# Patient Record
Sex: MALE | Race: WHITE | NOT HISPANIC OR LATINO | Employment: FULL TIME | URBAN - METROPOLITAN AREA
[De-identification: names, ages, dates, MRNs, and addresses within clinical notes are randomized per-mention and may not be internally consistent; named-entity substitution may affect disease eponyms.]

---

## 2017-06-02 ENCOUNTER — OFFICE VISIT (OUTPATIENT)
Dept: URGENT CARE | Facility: CLINIC | Age: 22
End: 2017-06-02
Payer: COMMERCIAL

## 2017-06-02 VITALS
SYSTOLIC BLOOD PRESSURE: 112 MMHG | TEMPERATURE: 98.8 F | BODY MASS INDEX: 21.3 KG/M2 | RESPIRATION RATE: 16 BRPM | WEIGHT: 136 LBS | OXYGEN SATURATION: 97 % | HEART RATE: 106 BPM | DIASTOLIC BLOOD PRESSURE: 80 MMHG

## 2017-06-02 DIAGNOSIS — R05.9 COUGH: ICD-10-CM

## 2017-06-02 DIAGNOSIS — H57.9 EYE PRESSURE: ICD-10-CM

## 2017-06-02 DIAGNOSIS — J01.00 ACUTE MAXILLARY SINUSITIS, RECURRENCE NOT SPECIFIED: ICD-10-CM

## 2017-06-02 PROCEDURE — 99214 OFFICE O/P EST MOD 30 MIN: CPT | Performed by: PHYSICIAN ASSISTANT

## 2017-06-02 RX ORDER — ACETAMINOPHEN 325 MG/1
650 TABLET ORAL EVERY 4 HOURS PRN
COMMUNITY
End: 2019-07-02

## 2017-06-02 RX ORDER — IBUPROFEN 200 MG
200 TABLET ORAL EVERY 6 HOURS PRN
COMMUNITY
End: 2019-07-02

## 2017-06-02 RX ORDER — FLUTICASONE PROPIONATE 50 MCG
2 SPRAY, SUSPENSION (ML) NASAL DAILY
Qty: 16 G | Refills: 0 | Status: SHIPPED | OUTPATIENT
Start: 2017-06-02 | End: 2019-07-02

## 2017-06-02 RX ORDER — AMOXICILLIN AND CLAVULANATE POTASSIUM 875; 125 MG/1; MG/1
1 TABLET, FILM COATED ORAL 2 TIMES DAILY
Qty: 20 TAB | Refills: 0 | Status: SHIPPED | OUTPATIENT
Start: 2017-06-02 | End: 2017-06-12

## 2017-06-02 ASSESSMENT — ENCOUNTER SYMPTOMS
SHORTNESS OF BREATH: 0
NAUSEA: 0
COUGH: 1
SPUTUM PRODUCTION: 1
ABDOMINAL PAIN: 0
SORE THROAT: 0
CHILLS: 1
DOUBLE VISION: 0
FEVER: 0
BLURRED VISION: 0
EYE DISCHARGE: 1
HEADACHES: 1
VOMITING: 0
EYE PAIN: 0
WHEEZING: 0
DIARRHEA: 0
EYE REDNESS: 0
PHOTOPHOBIA: 0

## 2017-06-02 NOTE — PROGRESS NOTES
Subjective:      [facility] 21 y.[facility] who presents with Sinus Problem and Cough            Sinus Problem  Associated symptoms include chills, congestion, coughing and headaches. Pertinent negatives include no ear pain, shortness of breath or sore throat.   Cough  Associated symptoms include chills and headaches. Pertinent negatives include no ear pain, eye redness, fever, rash, sore throat, shortness of breath or wheezing. His past medical history is significant for environmental allergies ( no tx).   lst 10d of sinus press to right side, c/o pressure to right eye, clear fluids draining from right eye, denies fecver, c/o chills, cough prod, denies chest congsetion, denies sorethroat - now resolved, right ear pressure, HA, denies nausea/vomiting/abdpain/diarrhea/rash. C/o mild dizziness w/ standing up. Denies PMH of asthma, PMH of brcohhitis last novemenber, denies PMH of pneumonia, denies known PMH of seasonal allerg.     Review of Systems   Constitutional: Positive for chills. Negative for fever.   HENT: Positive for congestion. Negative for ear pain and sore throat.    Eyes: Positive for discharge ( clear). Negative for blurred vision, double vision, photophobia, pain ( c/o pressure w/ sinus congestion) and redness.   Respiratory: Positive for cough and sputum production. Negative for shortness of breath and wheezing.    Gastrointestinal: Negative for nausea, vomiting, abdominal pain and diarrhea.   Skin: Negative for rash.   Neurological: Positive for headaches.   Endo/Heme/Allergies: Positive for environmental allergies ( no tx).     PMH:  has no past medical history on file.  MEDS:   Current outpatient prescriptions:   •  azithromycin (ZITHROMAX) 250 MG Tab, Take as directed, Disp: 6 Tab, Rfl: 0  •  fluticasone (FLONASE) 50 MCG/ACT nasal spray, Spray 1 Spray in nose every day., Disp: 1 Bottle, Rfl: 0  ALLERGIES: No Known Allergies  SURGHX: No past surgical history on file.  SOCHX:   reports that he has never smoked. He has never used smokeless tobacco. He reports that he does not drink alcohol or use illicit drugs.  FH: Family history was reviewed, no pertinent findings to report    I have worn a mask for the entire encounter with this patient.        Objective:     /80 mmHg  Pulse 106  Temp(Src) 37.1 °C (98.8 °F)  Resp 16  Wt 61.689 kg (136 lb)  SpO2 97%     Physical Exam   Constitutional: He is oriented to person, place, and time. He appears well-developed and well-nourished. No distress.   HENT:   Head: Normocephalic and atraumatic.   Right Ear: External ear and ear canal normal. Tympanic membrane is scarred and bulging. Tympanic membrane is not erythematous.   Left Ear: External ear and ear canal normal. Tympanic membrane is bulging. Tympanic membrane is not erythematous.   Nose: Right sinus exhibits maxillary sinus tenderness and frontal sinus tenderness. Left sinus exhibits no maxillary sinus tenderness and no frontal sinus tenderness.   Mouth/Throat: Uvula is midline and mucous membranes are normal. Posterior oropharyngeal erythema ( PND) present. No oropharyngeal exudate, posterior oropharyngeal edema or tonsillar abscesses.   Eyes: Conjunctivae, EOM and lids are normal. Pupils are equal, round, and reactive to light. Right eye exhibits no discharge, no exudate and no hordeolum. Left eye exhibits no discharge, no exudate and no hordeolum. Right conjunctiva is not injected. Left conjunctiva is not injected. No scleral icterus. Right eye exhibits normal extraocular motion. Left eye exhibits normal extraocular motion.   Neck: Neck supple.   Pulmonary/Chest: Effort normal. No respiratory distress. He has no decreased breath sounds. He has no wheezes. He has no rhonchi. He has no rales.   Musculoskeletal: Normal range of motion.   Lymphadenopathy:     He has cervical adenopathy.   Neurological: He is alert and oriented to person, place, and time. Coordination normal.   Skin: Skin  is warm and dry. He is not diaphoretic. No pallor.   Psychiatric: He has a normal mood and affect.   Nursing note and vitals reviewed.              Assessment/Plan:     1. Acute maxillary sinusitis, recurrence not specified  Supportive care is reviewed with patient/caregiver - recommend to push PO fluids and electrolytes, Nsaids/tylenol, netti pot/saline irrig, humidifier in home, flonase, ponaris, antihistamines,  take full course of Rx, take with probiotics, observe for resolution  Return to clinic with lack of resolution or progression of symptoms.  ER precautions with any worsening symptoms are reviewed with patient/caregiver and they do express understanding    Stress the importance of both trending resolution w/ tx plan as well as tx'ing seasonal allerg    - amoxicillin-clavulanate (AUGMENTIN) 875-125 MG Tab; Take 1 Tab by mouth 2 times a day for 10 days.  Dispense: 20 Tab; Refill: 0  - fluticasone (FLONASE) 50 MCG/ACT nasal spray; Spray 2 Sprays in nose every day.  Dispense: 16 g; Refill: 0    2. Cough      3. Eye pressure

## 2017-06-02 NOTE — MR AVS SNAPSHOT
Dionisio Rustw   2017 1:00 PM   Office Visit   MRN: [id number]    Department:  [facility]   Dept Phone:  [phone]    Description:  Male : 1995   Provider:  Ki Lassiter PA-C           Reason for Visit     Sinus Problem x 10 days, nasal congestion, stuffy nose, pain on Rt. side of face, headaches, Rt. eye pressure and watery.      Cough x 10 days, productive cough and some wheezing last night.  Rapid pulse today      Allergies as of 2017     No Known Allergies      You were diagnosed with     Acute maxillary sinusitis, recurrence not specified   [4672920]       Cough   [786.2.ICD-9-CM]       Eye pressure   [269721]         Vital Signs     Blood Pressure Pulse Temperature Respirations Weight Oxygen Saturation    112/80 mmHg 106 37.1 °C (98.8 °F) 16 61.689 kg (136 lb) 97%    Smoking Status                   Never Smoker            Basic Information     Date Of Birth Sex Race Ethnicity Preferred Language    1995 Male White Non- English      Health Maintenance        Date Due Completion Dates    IMM HEP B VACCINE (1 of 3 - Primary Series) 1996 ---    IMM HEP A VACCINE (1 of 2 - Standard Series) 1997 ---    IMM HPV VACCINE (1 of 3 - Male 3 Dose Series) 2007 ---    IMM VARICELLA (CHICKENPOX) VACCINE (1 of 2 - 2 Dose Adolescent Series) 2009 ---    IMM MENINGOCOCCAL VACCINE (MCV4) (1 of 1) 2012 ---    IMM DTaP/Tdap/Td Vaccine (1 - Tdap) 2015 ---            Current Immunizations     No immunizations on file.      Below and/or attached are the medications your provider expects you to take. Review all of your home medications and newly ordered medications with your provider and/or pharmacist. Follow medication instructions as directed by your provider and/or pharmacist. Please keep your medication list with you and share with your provider. Update the information when medications are discontinued, doses are changed, or new  medications (including over-the-counter products) are added; and carry medication information at all times in the event of emergency situations     Allergies:  No Known Allergies          Medications  Valid as of: June 02, 2017 -  1:34 PM    Generic Name Brand Name Tablet Size Instructions for use    Acetaminophen (Tab) TYLENOL 325 MG Take 650 mg by mouth every four hours as needed.        Amoxicillin-Pot Clavulanate (Tab) AUGMENTIN 875-125 MG Take 1 Tab by mouth 2 times a day for 10 days.        Azithromycin (Tab) ZITHROMAX 250 MG Take as directed        Fluticasone Propionate (Suspension) FLONASE 50 MCG/ACT Spray 1 Spray in nose every day.        Fluticasone Propionate (Suspension) FLONASE 50 MCG/ACT Spray 2 Sprays in nose every day.        Ibuprofen (Tab) MOTRIN 200 MG Take 200 mg by mouth every 6 hours as needed.        Pseudoephedrine-APAP-DM   Take  by mouth.        .                 Medicines prescribed today were sent to:     [facility][id number] - [place], [place] - [address]    [address] [facility] [address]    Phone: [phone] Fax: [phone]    Open 24 Hours?: No    [id number] - [place], [place] - [address]    [address] [place] [address]    Phone: [phone] Fax: [phone]    Open 24 Hours?: No      Medication refill instructions:       If your prescription bottle indicates you have medication refills left, it is not necessary to call your provider’s office. Please contact your pharmacy and they will refill your medication.    If your prescription bottle indicates you do not have any refills left, you may request refills at any time through one of the following ways: The online Off & Away system (except Urgent Care), by calling your provider’s office, or by asking your pharmacy to contact your provider’s office with a refill request. Medication refills are processed only during regular business hours and may not be available until the next business day. Your provider may  request additional information or to have a follow-up visit with you prior to refilling your medication.   *Please Note: Medication refills are assigned a new Rx number when refilled electronically. Your pharmacy may indicate that no refills were authorized even though a new prescription for the same medication is available at the pharmacy. Please request the medicine by name with the pharmacy before contacting your provider for a refill.           Statwing Access Code: [email or web address]  Expires: 7/2/2017  1:34 PM    Statwing  A secure, online tool to manage your health information     [employer]’s Statwing® is a secure, online tool that connects you to your personalized health information from the privacy of your home -- day or night - making it very easy for you to manage your healthcare. Once the activation process is completed, you can even access your medical information using the Statwing lizy, which is available for free in the Apple Lizy store or Google Play store.     Statwing provides the following levels of access (as shown below):   My Chart Features   [facility] Doctor [facility]  Specialists [facility]  Urgent  Care Non-[facility]  Primary Care  Doctor   Email your healthcare team securely and privately 24/7 X X X    Manage appointments: schedule your next appointment; view details of past/upcoming appointments X      Request prescription refills. X      View recent personal medical records, including lab and immunizations X X X X   View health record, including health history, allergies, medications X X X X   Read reports about your outpatient visits, procedures, consult and ER notes X X X X   See your discharge summary, which is a recap of your hospital and/or ER visit that includes your diagnosis, lab results, and care plan. X X       How to register for Statwing:  1. Go to  [email or web address].  2. Click on the Sign Up Now box, which takes you to the New Member Sign Up page. You will need to  provide the following information:  a. Enter your Eloquii Access Code exactly as it appears at the top of this page. (You will not need to use this code after you’ve completed the sign-up process. If you do not sign up before the expiration date, you must request a new code.)   b. Enter your date of birth.   c. Enter your home email address.   d. Click Submit, and follow the next screen’s instructions.  3. Create a Eloquii ID. This will be your Eloquii login ID and cannot be changed, so think of one that is secure and easy to remember.  4. Create a Eloquii password. You can change your password at any time.  5. Enter your Password Reset Question and Answer. This can be used at a later time if you forget your password.   6. Enter your e-mail address. This allows you to receive e-mail notifications when new information is available in Eloquii.  7. Click Sign Up. You can now view your health information.    For assistance activating your Eloquii account, call [phone]

## 2017-06-02 NOTE — Clinical Note
June 2, 2017       Patient: Dionisio Ochoa   YOB: 1995   Date of Visit: 6/2/2017         To Whom It May Concern:    It is my medical opinion that Dionisio Ochoa should be excused from work for yesterday and today due to illness.      If you have any questions or concerns, please don't hesitate to call [phone]          Sincerely,          Ki Lassiter PA-C  Electronically Signed

## 2017-09-06 ENCOUNTER — NON-PROVIDER VISIT (OUTPATIENT)
Dept: URGENT CARE | Facility: CLINIC | Age: 22
End: 2017-09-06

## 2017-09-06 DIAGNOSIS — Z02.1 PRE-EMPLOYMENT DRUG SCREENING: ICD-10-CM

## 2017-09-06 LAB
AMP AMPHETAMINE: NORMAL
COC COCAINE: NORMAL
INT CON NEG: NORMAL
INT CON POS: NORMAL
MET METHAMPHETAMINES: NORMAL
OPI OPIATES: NORMAL
PCP PHENCYCLIDINE: NORMAL
POC DRUG COMMENT 753798-OCCUPATIONAL HEALTH: NEGATIVE
THC: NORMAL

## 2017-09-06 PROCEDURE — 80305 DRUG TEST PRSMV DIR OPT OBS: CPT | Performed by: PHYSICIAN ASSISTANT

## 2017-12-27 ENCOUNTER — OCCUPATIONAL MEDICINE (OUTPATIENT)
Dept: URGENT CARE | Facility: CLINIC | Age: 22
End: 2017-12-27
Payer: COMMERCIAL

## 2017-12-27 VITALS
OXYGEN SATURATION: 98 % | WEIGHT: 140 LBS | RESPIRATION RATE: 14 BRPM | HEIGHT: 67 IN | SYSTOLIC BLOOD PRESSURE: 110 MMHG | TEMPERATURE: 97.2 F | BODY MASS INDEX: 21.97 KG/M2 | DIASTOLIC BLOOD PRESSURE: 68 MMHG | HEART RATE: 78 BPM

## 2017-12-27 DIAGNOSIS — Z02.83 ENCOUNTER FOR DRUG SCREENING: ICD-10-CM

## 2017-12-27 DIAGNOSIS — S00.03XA CONTUSION OF SCALP, INITIAL ENCOUNTER: ICD-10-CM

## 2017-12-27 DIAGNOSIS — S00.01XA ABRASION OF SCALP, INITIAL ENCOUNTER: ICD-10-CM

## 2017-12-27 DIAGNOSIS — Z02.1 PRE-EMPLOYMENT DRUG SCREENING: ICD-10-CM

## 2017-12-27 LAB
AMP AMPHETAMINE: NORMAL
BREATH ALCOHOL COMMENT: NORMAL
COC COCAINE: NORMAL
INT CON NEG: NORMAL
INT CON POS: NORMAL
MET METHAMPHETAMINES: NORMAL
OPI OPIATES: NORMAL
PCP PHENCYCLIDINE: NORMAL
POC BREATHALIZER: 0 PERCENT (ref 0–0.01)
POC DRUG COMMENT 753798-OCCUPATIONAL HEALTH: NEGATIVE
THC: NORMAL

## 2017-12-27 PROCEDURE — 99202 OFFICE O/P NEW SF 15 MIN: CPT | Performed by: EMERGENCY MEDICINE

## 2017-12-27 PROCEDURE — 80305 DRUG TEST PRSMV DIR OPT OBS: CPT | Performed by: EMERGENCY MEDICINE

## 2017-12-27 PROCEDURE — 82075 ASSAY OF BREATH ETHANOL: CPT | Performed by: EMERGENCY MEDICINE

## 2017-12-27 ASSESSMENT — ENCOUNTER SYMPTOMS
HEADACHES: 0
SENSORY CHANGE: 0
NECK PAIN: 0
LOSS OF CONSCIOUSNESS: 0

## 2017-12-27 NOTE — PATIENT INSTRUCTIONS
Head Injury, Adult  You have a head injury. Headaches and throwing up (vomiting) are common after a head injury. It should be easy to wake up from sleeping. Sometimes you must stay in the hospital. Most problems happen within the first 24 hours. Side effects may occur up to 7-10 days after the injury.   WHAT ARE THE TYPES OF HEAD INJURIES?  Head injuries can be as minor as a bump. Some head injuries can be more severe. More severe head injuries include:  · A jarring injury to the brain (concussion).  · A bruise of the brain (contusion). This mean there is bleeding in the brain that can cause swelling.  · A cracked skull (skull fracture).  · Bleeding in the brain that collects, clots, and forms a bump (hematoma).  WHEN SHOULD I GET HELP RIGHT AWAY?   · You are confused or sleepy.  · You cannot be woken up.  · You feel sick to your stomach (nauseous) or keep throwing up (vomiting).  · Your dizziness or unsteadiness is getting worse.  · You have very bad, lasting headaches that are not helped by medicine. Take medicines only as told by your doctor.  · You cannot use your arms or legs like normal.  · You cannot walk.  · You notice changes in the black spots in the center of the colored part of your eye (pupil).  · You have clear or bloody fluid coming from your nose or ears.  · You have trouble seeing.  During the next 24 hours after the injury, you must stay with someone who can watch you. This person should get help right away (call 911 in the [place] if you start to shake and are not able to control it (have seizures), you pass out, or you are unable to wake up.  HOW CAN I PREVENT A HEAD INJURY IN THE FUTURE?  · Wear seat belts.  · Wear a helmet while bike riding and playing sports like football.  · Stay away from dangerous activities around the house.  WHEN CAN I RETURN TO NORMAL ACTIVITIES AND ATHLETICS?  See your doctor before doing these activities. You should not do normal activities or play contact sports until 1  week after the following symptoms have stopped:  · Headache that does not go away.  · Dizziness.  · Poor attention.  · Confusion.  · Memory problems.  · Sickness to your stomach or throwing up.  · Tiredness.  · Fussiness.  · Bothered by bright lights or loud noises.  · Anxiousness or depression.  · Restless sleep.  MAKE SURE YOU:   · Understand these instructions.  · Will watch your condition.  · Will get help right away if you are not doing well or get worse.     This information is not intended to replace advice given to you by your health care provider. Make sure you discuss any questions you have with your health care provider.     Document Released: 11/30/2009 Document Revised: 01/08/2016 Document Reviewed: 08/25/2014  [employer] Interactive Patient Education ©2016 [employer]

## 2017-12-27 NOTE — PROGRESS NOTES
"Subjective:      Dionisio Ochoa is a 22 y.o. male who presents with Head Injury (NEW WCright side of head hit a mirro )      Date of injury: 12/27/2017. 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 Previous injury: Concussion. Second job: none. Outside activity: none. Contributing medical illness: none. Severity: mild, improving. Prior treatment: cleansed. Location: right frontal scalp. Radiation: none. Numbness/tingling: none. Focal weakness: none. No loss of consciousness, dizziness, nausea or vomiting, neck pain. Tetanus up-to-date     Head Injury    Pertinent negatives include no headaches.       Review of Systems   Constitutional: Negative for malaise/fatigue.   Musculoskeletal: Negative for neck pain.   Neurological: Negative for sensory change, loss of consciousness and headaches.     PMH:  has no past medical history on file.  MEDS:   Current Outpatient Prescriptions:   •  Pseudoephedrine-APAP-DM (DAYQUIL PO), Take  by mouth., Disp: , Rfl:   •  ibuprofen (MOTRIN) 200 MG Tab, Take 200 mg by mouth every 6 hours as needed., Disp: , Rfl:   •  acetaminophen (TYLENOL) 325 MG Tab, Take 650 mg by mouth every four hours as needed., Disp: , Rfl:   •  fluticasone (FLONASE) 50 MCG/ACT nasal spray, Spray 2 Sprays in nose every day., Disp: 16 g, Rfl: 0  •  azithromycin (ZITHROMAX) 250 MG Tab, Take as directed, Disp: 6 Tab, Rfl: 0  •  fluticasone (FLONASE) 50 MCG/ACT nasal spray, Spray 1 Spray in nose every day., Disp: 1 Bottle, Rfl: 0  ALLERGIES: No Known Allergies  SURGHX: History reviewed. No pertinent surgical history.  SOCHX:  reports that he has been smoking.  He has never used smokeless tobacco. He reports that he drinks alcohol. He reports that he does not use drugs.  FH: family history is not on file.     Objective:     /68   Pulse 78   Temp 36.2 °C (97.2 °F)   Resp 14   Ht 1.702 m (5' 7\")   Wt 63.5 kg " (140 lb)   SpO2 98%   BMI 21.93 kg/m²      Physical Exam   Cardiovascular: Normal rate, regular rhythm and normal heart sounds.    Pulmonary/Chest: Effort normal and breath sounds normal.       Gen.: Alert, cooperative, no acute distress. Head: Minimal swelling, minor abrasion right frontal region. No bony defect or foreign body. Eyes: Pupils equal round and reactive to light, extraocular movement intact. Conjunctiva clear. Nose clear without epistaxis. Ears with normal tympanic membranes. Neck supple, nontender, normal range of motion. Neurological: Alert and oriented ×3, cranial nerves intact, no motor or sensory function deficit, no tremor or cerebellar signs.     D39 and C4 forms completed  Assessment/Plan:     1. Contusion of scalp, initial encounter  Head injury precautions provided    2. Abrasion of scalp, initial encounter    3. Encounter for drug screening  - POCT Breath Alcohol Test  - POCT 6 Panel Urine Drug Screen    4. Pre-employment drug screening

## 2017-12-27 NOTE — LETTER
[facility]  [address] [place] [address]  Phone:  [phone] - Fax:  [phone]   [facility] Progress Report and Disability Certification  Date of Service: 12/27/2017   No Show:  No  Date / Time of Next Visit:  MMI   Claim Information   Patient Name: Dionisio Ochoa  Claim Number:     Employer: [employer]  Date of Injury: 12/27/2017     Insurer / TPA: [employer]  ID / SSN:     Occupation: [id number]  Diagnosis: Diagnoses of Contusion of scalp, initial encounter, Abrasion of scalp, initial encounter, Encounter for drug screening, and Pre-employment drug screening were pertinent to this visit.    Medical Information   Related to Industrial Injury? Yes    Subjective Complaints:  Date of injury: 12/27/2017. 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 Previous injury: Concussion. Second job: none. Outside activity: none. Contributing medical illness: none. Severity: mild, improving. Prior treatment: cleansed. Location: right frontal scalp. Radiation: none. Numbness/tingling: none. Focal weakness: none. No loss of consciousness, dizziness, nausea or vomiting, neck pain. Tetanus up-to-date   Objective Findings: Gen.: Alert, cooperative, no acute distress. Head: Minimal swelling, minor abrasion right frontal region. No bony defect or foreign body. Eyes: Pupils equal round and reactive to light, extraocular movement intact. Conjunctiva clear. Nose clear without epistaxis. Ears with normal tympanic membranes. Neck supple, nontender, normal range of motion. Neurological: Alert and oriented ×3, cranial nerves intact, no motor or sensory function deficit, no tremor or cerebellar signs.   Pre-Existing Condition(s):     Assessment:   Initial Visit    Status: Discharged / Care Transfer  Permanent Disability:No    Plan:      Diagnostics:      Comments:        Disability Information   Status: Released to Full Duty    From:     Through:   Restrictions are:     Physical Restrictions   Sitting:    Standing:    Stooping:    Bending:      Squatting:    Walking:    Climbing:    Pushing:      Pulling:    Other:    Reaching Above Shoulder (L):   Reaching Above Shoulder (R):       Reaching Below Shoulder (L):    Reaching Below Shoulder (R):      Not to exceed Weight Limits   Carrying(hrs):   Weight Limit(lb):   Lifting(hrs):   Weight  Limit(lb):     Comments:      Repetitive Actions   Hands: i.e. Fine Manipulations from Grasping:     Feet: i.e. Operating Foot Controls:     Driving / Operate Machinery:     Physician Name: Bill Stark M.D. Physician Signature: BILL Villaseñor M.D. e-Signature: Dr. Wiley Cunningham, Medical Director   Clinic Name / Location: [address] [address] Clinic Phone Number: Dept: [phone]   Appointment Time: 11:30 Am Visit Start Time: 2:29 PM   Check-In Time:  2:22 Pm Visit Discharge Time:  3:06pm   Original-Treating Physician or Chiropractor    Page 2-Insurer/TPA    Page 3-Employer    Page 4-Employee

## 2017-12-27 NOTE — LETTER
"EMPLOYEE’S CLAIM FOR COMPENSATION/ REPORT OF INITIAL TREATMENT  FORM C-4    EMPLOYEE’S CLAIM - PROVIDE ALL INFORMATION REQUESTED   First Name  Dionisio Last Name  Don Birthdate                    1995                Sex  male Claim Number   Home Address  [address] Age  22 y.o. Height  1.702 m (5' 7\") Weight  63.5 kg (140 lb) [facility]     [place]  [address] Telephone  [phone] (home)    Mailing Address  [address] [place]  [address] Primary Language Spoken  English    Insurer   Third Party   [place] [employer]   Employee's Occupation (Job Title) When Injury or Occupational Disease Occurred  [id number]    Employer's Name  [employer]  Telephone  [phone]    Employer Address  [address] [address]  [facility] [place]  [address]    Date of Injury  12/27/2017               Hour of Injury  10:30 AM Date Employer Notified  12/27/2017 Last Day of Work after Injury or Occupational Disease  12/27/2017 Supervisor to Whom Injury Reported  Maurisio Grimm   Address or Location of Accident (if applicable)  [[address] [place] [address]]   What were you doing at the time of accident? (if applicable)  Wiping a tire    How did this injury or occupational disease occur? (Be specific an answer in detail. Use additional sheet if necessary)  Stood up under a lifted trucks mirror   If you believe that you have an occupational disease, when did you first have knowledge of the disability and it relationship to your employment?  N/A Witnesses to the Accident  Bill Santiago      Nature of Injury or Occupational Disease  Laceration  Part(s) of Body Injured or Affected  Skull, N/A, N/A    I certify that the above is true and correct to the best of my knowledge and that I have provided this information in order to obtain the benefits of [place] and " [place]  I hereby authorize any physician, chiropractor, surgeon, practitioner, or other person, any hospital, including [facility] or [facility], any medical service organization, any insurance company, or other institution or organization to release to each other, any medical or other information, including benefits paid or payable, pertinent to this injury or disease, except information relative to diagnosis, treatment and/or counseling for AIDS, psychological conditions, alcohol or controlled substances, for which I must give specific authorization.  A Photostat of this authorization shall be as valid as the original.     Date   Place   Employee’s Signature   THIS REPORT MUST BE COMPLETED AND MAILED WITHIN 3 WORKING DAYS OF TREATMENT   Place  [facility]  Name of Facility  [facility]   Date  12/27/2017 Diagnosis  (S00.03XA) Contusion of scalp, initial encounter  (S00.01XA) Abrasion of scalp, initial encounter  (Z02.83) Encounter for drug screening  (Z02.1) Pre-employment drug screening Is there evidence the injured employee was under the influence of alcohol and/or another controlled substance at the time of accident?   Hour  2:29 PM Description of Injury or Disease  Diagnoses of Contusion of scalp, initial encounter, Abrasion of scalp, initial encounter, Encounter for drug screening, and Pre-employment drug screening were pertinent to this visit. No   Treatment  Cleansed abrasion  Have you advised the patient to remain off work five days or more? No   X-Ray Findings      If Yes   From Date  To Date      From information given by the employee, together with medical evidence, can you directly connect this injury or occupational disease as job incurred?  Yes If No Full Duty  Yes Modified Duty      Is additional medical care by a physician indicated?  No If Modified Duty, Specify any Limitations / Restrictions     "  Do you know of any previous injury or disease contributing to this condition or occupational disease?                            No   Date  12/27/2017 Print Doctor’s Name Bill Stark M.D. I certify the employer’s copy of  this form was mailed on:   Address  [address]     [place] Zip  [address]    Provider’s Tax ID Number  [id number] Telephone  Dept: [phone]        [email or web address]-[email or web address] Howard M.D.   e-Signature: Dr. Wiley Cunningham, Medical Director Degree  MD        ORIGINAL-TREATING PHYSICIAN OR CHIROPRACTOR    PAGE 2-INSURER/TPA    PAGE 3-EMPLOYER    PAGE 4-EMPLOYEE             Form C-4 (rev10/07)              BRIEF DESCRIPTION OF RIGHTS AND BENEFITS  (Pursuant to [place]RS 616C.050)    Notice of Injury or Occupational Disease (Incident Report Form C-1): If an injury or occupational disease (OD) arises out of and in the  course of employment, you must provide written notice to your employer as soon as practicable, but no later than 7 days after the accident or  OD. Your employer shall maintain a sufficient supply of the required forms.    Claim for Compensation (Form C-4): If medical treatment is sought, the form C-4 is available at the place of initial treatment. A completed  \"Claim for Compensation\" (Form C-4) must be filed within 90 days after an accident or OD. The treating physician or chiropractor must,  within 3 working days after treatment, complete and mail to the employer, the employer's insurer and third-party , the Claim for  Compensation.    Medical Treatment: If you require medical treatment for your on-the-job injury or OD, you may be required to select a physician or  chiropractor from a list provided by your workers’ compensation insurer, if it has contracted with an Organization for Managed Care (MCO) or  Preferred Provider Organization (PPO) or providers of health care. If your employer has not entered into a contract with an MCO or PPO, " you  may select a physician or chiropractor from the Panel of Physicians and Chiropractors. Any medical costs related to your industrial injury or  OD will be paid by your insurer.    Temporary Total Disability (TTD): If your doctor has certified that you are unable to work for a period of at least 5 consecutive days, or 5  cumulative days in a 20-day period, or places restrictions on you that your employer does not accommodate, you may be entitled to TTD  compensation.    Temporary Partial Disability (TPD): If the wage you receive upon reemployment is less than the compensation for TTD to which you are  entitled, the insurer may be required to pay you TPD compensation to make up the difference. TPD can only be paid for a maximum of 24  months.    Permanent Partial Disability (PPD): When your medical condition is stable and there is an indication of a PPD as a result of your injury or  OD, within 30 days, your insurer must arrange for an evaluation by a rating physician or chiropractor to determine the degree of your PPD. The  amount of your PPD award depends on the date of injury, the results of the PPD evaluation and your age and wage.    Permanent Total Disability (PTD): If you are medically certified by a treating physician or chiropractor as permanently and totally disabled  and have been granted a PTD status by your insurer, you are entitled to receive monthly benefits not to exceed 66 2/3% of your average  monthly wage. The amount of your PTD payments is subject to reduction if you previously received a PPD award.    Vocational Rehabilitation Services: You may be eligible for vocational rehabilitation services if you are unable to return to the job due to a  permanent physical impairment or permanent restrictions as a result of your injury or occupational disease.    Transportation and Per Brown Reimbursement: You may be eligible for travel expenses and per brown associated with medical treatment.    Reopening:  You may be able to reopen your claim if your condition worsens after claim closure.    Appeal Process: If you disagree with a written determination issued by the insurer or the insurer does not respond to your request, you may  appeal to the Department of Administration, , by following the instructions contained in your determination letter. You must  appeal the determination within 70 days from the date of the determination letter at [address], [address], [place], [place]  [address], or [address], [address], [place], [place] [address]. If you disagree with the  decision, you may appeal to the  Department of Administration, . You must file your appeal within 30 days from the date of the  decision  letter at [address], [address], [place], [place] [address], or [address], [place], [place] [address]. If you  disagree with a decision of an , you may file a petition for judicial review with the District Court. You must do so within 30  days of the Appeal Officer’s decision. You may be represented by an  at your own expense or you may contact the [facility] for possible  representation.    [place]  for Injured Workers (NAIW): If you disagree with a  decision, you may request that NAIW represent you  without charge at an  Hearing. For information regarding denial of benefits, you may contact the [facility] at: [address], [address], [place] [address], [phone], or [address], [place] [address], [phone]    To File a Complaint with the Division: If you wish to file a complaint with the  of the Division of Industrial Relations (DIR),  please contact the Workers’ Compensation Section, [address], [address], [place], [place] [address], telephone [phone], or  [address],  [address], [place] [address], telephone [phone].    For assistance with Workers’ Compensation Issues: you may contact the Office of the Governor Consumer Health Assistance, [address] [place] [address], [address], [place], [place] [address], Toll Free 1-150.875.6169, Web site: [email or web address]., E-mail  [email or web address]                                                                                                                                                                                                                                   __________________________________________________________________                                                                   _________________                Employee Name / Signature                                                                                                                                                       Date                                                                                                                                                                                                     D-2 (rev. 10/07)

## 2019-02-19 ENCOUNTER — OFFICE VISIT (OUTPATIENT)
Dept: URGENT CARE | Facility: CLINIC | Age: 24
End: 2019-02-19
Payer: COMMERCIAL

## 2019-02-19 VITALS
SYSTOLIC BLOOD PRESSURE: 102 MMHG | RESPIRATION RATE: 16 BRPM | TEMPERATURE: 99.4 F | BODY MASS INDEX: 22.44 KG/M2 | WEIGHT: 143 LBS | HEART RATE: 78 BPM | HEIGHT: 67 IN | OXYGEN SATURATION: 98 % | DIASTOLIC BLOOD PRESSURE: 62 MMHG

## 2019-02-19 DIAGNOSIS — J01.00 ACUTE MAXILLARY SINUSITIS, RECURRENCE NOT SPECIFIED: ICD-10-CM

## 2019-02-19 DIAGNOSIS — R05.9 COUGH: ICD-10-CM

## 2019-02-19 PROCEDURE — 99214 OFFICE O/P EST MOD 30 MIN: CPT | Performed by: FAMILY MEDICINE

## 2019-02-19 RX ORDER — AMOXICILLIN AND CLAVULANATE POTASSIUM 875; 125 MG/1; MG/1
1 TABLET, FILM COATED ORAL 2 TIMES DAILY
Qty: 20 TAB | Refills: 0 | Status: SHIPPED | OUTPATIENT
Start: 2019-02-19 | End: 2019-03-01

## 2019-02-19 RX ORDER — PROMETHAZINE HYDROCHLORIDE AND CODEINE PHOSPHATE 6.25; 1 MG/5ML; MG/5ML
5 SYRUP ORAL 4 TIMES DAILY PRN
Qty: 120 ML | Refills: 0 | Status: SHIPPED | OUTPATIENT
Start: 2019-02-19 | End: 2019-02-26

## 2019-02-19 NOTE — LETTER
February 19, 2019         Patient: Dionisio Ochoa   YOB: 1995   Date of Visit: 2/19/2019           To Whom it May Concern:    Dionisio Ochoa was seen in my clinic on 2/19/2019. Please excuse 2/19 and 2/20/2019.      Sincerely,           Manish Hughes M.D.  Electronically Signed

## 2019-02-20 NOTE — PROGRESS NOTES
"Subjective:      [facility] 23 [facility] who presents with Sinus Problem (x4days, sinus congestiona and pain, pressure around eyes, eyes water, congestion in lung area, slight cough)              4 days maxillary sinus pressure and drainage.  No fever.  Past medical history sinusitis, no sinus surgery.  Cough likely due to drainage. Initial SOB resolved.  No wheeze.  Symptoms are moderate severity and progressively worse. OTC emergenC without relief.  No other aggravating or alleviating factors.        Review of Systems   Constitutional: Negative for malaise/fatigue and weight loss.   HENT: Negative for ear discharge and ear pain.    Eyes: Negative for discharge and redness.   Respiratory: Negative for wheezing.    Cardiovascular: Negative for leg swelling.   Gastrointestinal: Negative for diarrhea and vomiting.   Musculoskeletal: Negative for joint pain and myalgias.   Skin: Negative for itching and rash.   Neurological: Negative for headaches.     .  Medications, Allergies, and current problem list reviewed today in Epic       Objective:     /62 (BP Location: Left arm, Patient Position: Sitting, BP Cuff Size: Adult)   Pulse 78   Temp 37.4 °C (99.4 °F) (Temporal)   Resp 16   Ht 1.702 m (5' 7\")   Wt 64.9 kg (143 lb)   SpO2 98%   BMI 22.40 kg/m²      Physical Exam   Constitutional: He is oriented to person, place, and time. He appears well-developed and well-nourished. No distress.   HENT:   Head: Normocephalic and atraumatic.   Right Ear: External ear normal.   Left Ear: External ear normal.   Tender maxillary sinus bilateral.  PND.   Eyes: Conjunctivae are normal.   Neck: Neck supple.   Cardiovascular: Normal rate, regular rhythm and normal heart sounds.    No murmur heard.  Pulmonary/Chest: Effort normal and breath sounds normal.   Lymphadenopathy:     He has no cervical adenopathy.   Neurological: He is alert and oriented to person, place, and time.   Skin: Skin is warm and " dry. No rash noted.               Assessment/Plan:     1. Acute maxillary sinusitis, recurrence not specified  amoxicillin-clavulanate (AUGMENTIN) 875-125 MG Tab   2. Cough  promethazine-codeine (PHENERGAN-CODEINE) 6.25-10 MG/5ML Syrup     Differential diagnosis, natural history, supportive care, and indications for immediate follow-up discussed at length.     Contingent antibiotic prescription given to patient to fill upon meeting criteria of guidelines discussed.     Nasal saline, decongestant, nasal corticosteroid

## 2019-02-28 ASSESSMENT — ENCOUNTER SYMPTOMS
EYE DISCHARGE: 0
HEADACHES: 0
EYE REDNESS: 0
WEIGHT LOSS: 0
DIARRHEA: 0
WHEEZING: 0
MYALGIAS: 0
VOMITING: 0

## 2019-07-02 ENCOUNTER — OFFICE VISIT (OUTPATIENT)
Dept: URGENT CARE | Facility: CLINIC | Age: 24
End: 2019-07-02
Payer: COMMERCIAL

## 2019-07-02 ENCOUNTER — APPOINTMENT (OUTPATIENT)
Dept: RADIOLOGY | Facility: IMAGING CENTER | Age: 24
End: 2019-07-02
Attending: FAMILY MEDICINE
Payer: COMMERCIAL

## 2019-07-02 VITALS
BODY MASS INDEX: 22.76 KG/M2 | TEMPERATURE: 98.4 F | RESPIRATION RATE: 16 BRPM | HEIGHT: 67 IN | HEART RATE: 86 BPM | DIASTOLIC BLOOD PRESSURE: 64 MMHG | OXYGEN SATURATION: 97 % | SYSTOLIC BLOOD PRESSURE: 110 MMHG | WEIGHT: 145 LBS

## 2019-07-02 DIAGNOSIS — S83.91XA SPRAIN OF RIGHT KNEE, UNSPECIFIED LIGAMENT, INITIAL ENCOUNTER: ICD-10-CM

## 2019-07-02 PROCEDURE — 73564 X-RAY EXAM KNEE 4 OR MORE: CPT | Mod: TC,RT | Performed by: FAMILY MEDICINE

## 2019-07-02 PROCEDURE — 99214 OFFICE O/P EST MOD 30 MIN: CPT | Performed by: FAMILY MEDICINE

## 2019-07-02 RX ORDER — TRAMADOL HYDROCHLORIDE 50 MG/1
50 TABLET ORAL EVERY 6 HOURS PRN
Qty: 20 TAB | Refills: 0 | Status: SHIPPED | OUTPATIENT
Start: 2019-07-02 | End: 2019-07-09

## 2019-07-02 RX ORDER — MELOXICAM 15 MG/1
15 TABLET ORAL DAILY
Qty: 7 TAB | Refills: 1 | Status: SHIPPED | OUTPATIENT
Start: 2019-07-02 | End: 2019-07-09

## 2019-07-02 NOTE — PROGRESS NOTES
"Subjective:      Dionisio Barlow is a 23 y.o. male who presents with Knee Pain (hyper extended knee, x 1 week, )      - This is a pleasant and non toxic appearing 23 y.o. male with c/o pain Rt knee x 2 wks. Started after attempting a front flip and landed wrong. Worse walking, better rest           ALLERGIES:  Patient has no known allergies.     PMH:  History reviewed. No pertinent past medical history.     PSH:  History reviewed. No pertinent surgical history.    MEDS:    Current Outpatient Prescriptions:   •  meloxicam (MOBIC) 15 MG tablet, Take 1 Tab by mouth every day for 7 days., Disp: 7 Tab, Rfl: 1  •  tramadol (ULTRAM) 50 MG Tab, Take 1 Tab by mouth every 6 hours as needed for up to 7 days., Disp: 20 Tab, Rfl: 0    ** I have documented what I find to be significant in regards to past medical, social, family and surgical history  in my HPI or under PMH/PSH/FH review section, otherwise it is contributory **           HPI    Review of Systems   Musculoskeletal: Positive for joint pain.   All other systems reviewed and are negative.         Objective:     /64   Pulse 86   Temp 36.9 °C (98.4 °F)   Resp 16   Ht 1.702 m (5' 7\")   Wt 65.8 kg (145 lb)   SpO2 97%   BMI 22.71 kg/m²      Physical Exam   Constitutional: He appears well-developed. No distress.   HENT:   Head: Normocephalic and atraumatic.   Neck: Neck supple.   Cardiovascular: Regular rhythm.    No murmur heard.  Pulmonary/Chest: Effort normal. No respiratory distress.   Neurological: He is alert. He exhibits normal muscle tone.   Skin: Skin is warm and dry.   Psychiatric: He has a normal mood and affect. Judgment normal.   Nursing note and vitals reviewed.    Rt knee: no wounds, discoloration, gross deformity or effusions. Full srom. Mild TTP over entire knee reported. Ligaments menisci seem intact           Assessment/Plan:         1. Sprain of right knee, unspecified ligament, initial encounter  DX-KNEE COMPLETE 4+ RIGHT    " REFERRAL TO SPORTS MEDICINE    meloxicam (MOBIC) 15 MG tablet    tramadol (ULTRAM) 50 MG Tab       Xray: no acute findings by MY READ. RADIOLOGY READ PENDING.       Dx & d/c instructions discussed w/ patient and/or family members.     Follow up with PCP (or here if PCP unavailable) in 2-3 days if symptoms not improving, ER if feeling/getting worse.    Any realistic and/or common medication side effects that may have been given today(i.e. Rash, GI upset/constipation, sedation, elevation of BP or blood sugars) reviewed.     Patient left in stable condition

## 2019-07-02 NOTE — LETTER
July 2, 2019         Patient: Dionisio Barlow   YOB: 1995   Date of Visit: 7/2/2019           To Whom it May Concern:    Dionisio Barlow was seen in my clinic on 7/2/2019. He may return to work in 1-3 days.    If you have any questions or concerns, please don't hesitate to call.        Sincerely,           Demond Patton M.D.  Electronically Signed

## 2020-11-11 ENCOUNTER — OFFICE VISIT (OUTPATIENT)
Dept: URGENT CARE | Facility: CLINIC | Age: 25
End: 2020-11-11
Payer: COMMERCIAL

## 2020-11-11 ENCOUNTER — HOSPITAL ENCOUNTER (OUTPATIENT)
Facility: MEDICAL CENTER | Age: 25
End: 2020-11-11
Attending: PHYSICIAN ASSISTANT
Payer: COMMERCIAL

## 2020-11-11 VITALS
HEIGHT: 68 IN | HEART RATE: 86 BPM | SYSTOLIC BLOOD PRESSURE: 118 MMHG | RESPIRATION RATE: 20 BRPM | BODY MASS INDEX: 23.05 KG/M2 | TEMPERATURE: 97.7 F | WEIGHT: 152.08 LBS | DIASTOLIC BLOOD PRESSURE: 74 MMHG | OXYGEN SATURATION: 99 %

## 2020-11-11 DIAGNOSIS — J02.0 PHARYNGITIS DUE TO STREPTOCOCCUS SPECIES: ICD-10-CM

## 2020-11-11 DIAGNOSIS — R51.9 NONINTRACTABLE HEADACHE, UNSPECIFIED CHRONICITY PATTERN, UNSPECIFIED HEADACHE TYPE: ICD-10-CM

## 2020-11-11 LAB
INT CON NEG: NORMAL
INT CON POS: NORMAL
S PYO AG THROAT QL: POSITIVE

## 2020-11-11 PROCEDURE — U0003 INFECTIOUS AGENT DETECTION BY NUCLEIC ACID (DNA OR RNA); SEVERE ACUTE RESPIRATORY SYNDROME CORONAVIRUS 2 (SARS-COV-2) (CORONAVIRUS DISEASE [COVID-19]), AMPLIFIED PROBE TECHNIQUE, MAKING USE OF HIGH THROUGHPUT TECHNOLOGIES AS DESCRIBED BY CMS-2020-01-R: HCPCS

## 2020-11-11 PROCEDURE — 99214 OFFICE O/P EST MOD 30 MIN: CPT | Performed by: PHYSICIAN ASSISTANT

## 2020-11-11 PROCEDURE — 87880 STREP A ASSAY W/OPTIC: CPT | Performed by: PHYSICIAN ASSISTANT

## 2020-11-11 RX ORDER — LIDOCAINE HYDROCHLORIDE 20 MG/ML
5 SOLUTION OROPHARYNGEAL
Qty: 100 ML | Refills: 0 | Status: SHIPPED | OUTPATIENT
Start: 2020-11-11 | End: 2020-11-16

## 2020-11-11 RX ORDER — AMOXICILLIN 500 MG/1
500 CAPSULE ORAL 2 TIMES DAILY
Qty: 20 CAP | Refills: 0 | Status: SHIPPED | OUTPATIENT
Start: 2020-11-11 | End: 2020-11-21

## 2020-11-11 RX ORDER — IBUPROFEN 200 MG
600-800 TABLET ORAL EVERY 6 HOURS PRN
COMMUNITY

## 2020-11-11 ASSESSMENT — ENCOUNTER SYMPTOMS
VOMITING: 0
PALPITATIONS: 0
MYALGIAS: 0
CHILLS: 0
FEVER: 0
SORE THROAT: 1
EYE PAIN: 0
DIARRHEA: 0
TINGLING: 0
WHEEZING: 0
NAUSEA: 0
SENSORY CHANGE: 0
COUGH: 0
EYE DISCHARGE: 0
DIAPHORESIS: 0
STRIDOR: 0
DIZZINESS: 0
SINUS PAIN: 0
SPUTUM PRODUCTION: 0
HEADACHES: 1
ABDOMINAL PAIN: 0
SHORTNESS OF BREATH: 0

## 2020-11-11 NOTE — PROGRESS NOTES
Subjective:   Dionisio Barlow is a 25 y.o. male who presents for Pharyngitis, Coronavirus Screening, and Headache      HPI:  This is a very pleasant 25-year-old male presented to the clinic with sore throat and headache x4 days.  Patient states his symptoms started on Friday.  He felt fatigued and tired.  He then had a sore throat described as moderately painful.  Denies any difficulty swallowing.  Denies any hoarseness.  He has had occasional headaches over the same time.  He has used ibuprofen this provided moderate relief to his symptoms.  He denies any cough, shortness of breath or chest pain.  His boss tested positive for COVID-19 last week he has been at home since.  He denies any past medical history significant for asthma.  He no longer smokes cigarettes.    Review of Systems   Constitutional: Positive for malaise/fatigue. Negative for chills, diaphoresis and fever.   HENT: Positive for congestion and sore throat. Negative for ear pain and sinus pain.    Eyes: Negative for pain and discharge.   Respiratory: Negative for cough, sputum production, shortness of breath, wheezing and stridor.    Cardiovascular: Negative for chest pain and palpitations.   Gastrointestinal: Negative for abdominal pain, diarrhea, nausea and vomiting.   Musculoskeletal: Negative for myalgias.   Neurological: Positive for headaches. Negative for dizziness, tingling and sensory change.   Endo/Heme/Allergies: Negative for environmental allergies.       Medications:    • ibuprofen Tabs    Allergies: Patient has no known allergies.    Problem List: Dionisio Barlow does not have a problem list on file.    Surgical History:  No past surgical history on file.    Past Social Hx: Dionisio Barlow  reports that he has quit smoking. He has never used smokeless tobacco. He reports current alcohol use. He reports current drug use. Drug: Marijuana.     Past Family Hx:  Dionisio Barlow  "family history is not on file.     Problem list, medications, and allergies reviewed by myself today in Epic.     Objective:     /74   Pulse 86   Temp 36.5 °C (97.7 °F) (Temporal)   Resp 20   Ht 1.727 m (5' 8\")   Wt 69 kg (152 lb 1.3 oz)   SpO2 99%   BMI 23.12 kg/m²     Physical Exam  Constitutional:       General: He is not in acute distress.     Appearance: Normal appearance. He is not ill-appearing, toxic-appearing or diaphoretic.   HENT:      Head: Normocephalic and atraumatic.      Right Ear: Tympanic membrane, ear canal and external ear normal.      Left Ear: Tympanic membrane, ear canal and external ear normal.      Nose: Nose normal. No congestion or rhinorrhea.      Mouth/Throat:      Mouth: Mucous membranes are moist.      Pharynx: Posterior oropharyngeal erythema present. No oropharyngeal exudate.      Comments: Posterior oropharynx erythematous.  No tonsillar edema or exudate.  Uvula midline nondeviated.  Eyes:      Conjunctiva/sclera: Conjunctivae normal.   Neck:      Musculoskeletal: Normal range of motion. No neck rigidity or muscular tenderness.   Cardiovascular:      Rate and Rhythm: Normal rate and regular rhythm.      Pulses: Normal pulses.      Heart sounds: Normal heart sounds.   Pulmonary:      Effort: Pulmonary effort is normal.      Breath sounds: Normal breath sounds. No wheezing.   Abdominal:      Palpations: Abdomen is soft.      Tenderness: There is no abdominal tenderness.   Lymphadenopathy:      Cervical: No cervical adenopathy.   Skin:     General: Skin is warm and dry.      Capillary Refill: Capillary refill takes less than 2 seconds.   Neurological:      General: No focal deficit present.      Mental Status: He is alert. Mental status is at baseline.   Psychiatric:         Mood and Affect: Mood normal.         Thought Content: Thought content normal.       Rapid strep: Positive  COVID-19:    Assessment/Plan:     Diagnosis and associated orders:     1. Pharyngitis, " unspecified etiology  POCT Rapid Strep A    COVID/SARS COV-2 PCR   2. Nonintractable headache, unspecified chronicity pattern, unspecified headache type  POCT Rapid Strep A    COVID/SARS COV-2 PCR      Comments/MDM:     Recommended strict isolation precautions.  Stay isolated until I reach out with final results.  Results may take 2 to 3 days.  Recommended supportive treatment including increase fluids and rest.  Use Tylenol and ibuprofen as needed for pain and fever.  Take antibiotics as directed for strep pharyngitis.  May use viscous lidocaine as needed for sore throat.  Red flag symptoms were discussed with the patient at length today.  If any of these symptoms present return to the clinic or nearest emergency department.  Please call with any questions or concerns.         Differential diagnosis, natural history, supportive care, and indications for immediate follow-up discussed.    Advised the patient to follow-up with the primary care physician for recheck, reevaluation, and consideration of further management.    Please note that this dictation was created using voice recognition software. I have made reasonable attempt to correct obvious errors, but I expect that there are errors of grammar and possibly content that I did not discover before finalizing the note.    This note was electronically signed by DAMIEN Mendiola PA-C

## 2020-11-12 LAB
COVID ORDER STATUS COVID19: NORMAL
SARS-COV-2 RNA RESP QL NAA+PROBE: NOTDETECTED
SPECIMEN SOURCE: NORMAL

## 2021-12-29 ENCOUNTER — OFFICE VISIT (OUTPATIENT)
Dept: URGENT CARE | Facility: CLINIC | Age: 26
End: 2021-12-29
Payer: COMMERCIAL

## 2021-12-29 VITALS
BODY MASS INDEX: 22.73 KG/M2 | HEART RATE: 80 BPM | DIASTOLIC BLOOD PRESSURE: 62 MMHG | TEMPERATURE: 97.6 F | RESPIRATION RATE: 18 BRPM | SYSTOLIC BLOOD PRESSURE: 108 MMHG | OXYGEN SATURATION: 96 % | HEIGHT: 68 IN | WEIGHT: 150 LBS

## 2021-12-29 DIAGNOSIS — S39.012A STRAIN OF LUMBAR PARASPINAL MUSCLE, INITIAL ENCOUNTER: Primary | ICD-10-CM

## 2021-12-29 DIAGNOSIS — M25.551 ACUTE RIGHT HIP PAIN: ICD-10-CM
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
## 2021-12-29 PROCEDURE — 99213 OFFICE O/P EST LOW 20 MIN: CPT | Performed by: NURSE PRACTITIONER

## 2021-12-29 RX ORDER — CYCLOBENZAPRINE HCL 5 MG
5-10 TABLET ORAL
Qty: 15 TABLET | Refills: 0 | Status: SHIPPED | OUTPATIENT
Start: 2021-12-29 | End: 2022-01-03

## 2021-12-29 RX ORDER — IBUPROFEN 800 MG/1
800 TABLET ORAL EVERY 8 HOURS PRN
Qty: 30 TABLET | Refills: 0 | Status: SHIPPED | OUTPATIENT
Start: 2021-12-29 | End: 2022-01-03

## 2021-12-29 ASSESSMENT — ENCOUNTER SYMPTOMS
NECK PAIN: 0
SENSORY CHANGE: 0
TINGLING: 0
WEAKNESS: 0
FEVER: 0
MYALGIAS: 1
BACK PAIN: 1
CHILLS: 0
FALLS: 1

## 2021-12-29 ASSESSMENT — PAIN SCALES - GENERAL: PAINLEVEL: 6=MODERATE PAIN

## 2021-12-30 NOTE — PROGRESS NOTES
Subjective:     Dionisio Barlow is a 26 y.o. male who presents for Back Pain (fell on ice 2 days ago, Lower Rt side of back, landed on bottom, was sored, had bruises, pain worsening)      Back Pain  This is a new problem. The current episode started in the past 7 days (Dionisio is a pleasant 26 year old male ). Pertinent negatives include no fever, tingling or weakness.     Pt presents for evaluation of a new problem. Dionisio is a pleasant 26-year-old male presents to urgent care today with complaints of right back pain as well as right hip pain after slipping and falling on the ice 2 days ago at last swab.  He notes that a parking lot was not de-iced and discussed as well.  He states that his legs were swept out from under him and he fell landing on his right side.  Since this time he has been having constant right lower back pain right hip pain and bruising.  His pain level is rated as a 6/10.  He has used ibuprofen on 2 separate occasions for relief of his pain.  He notes that this did not provide any relief.  His pain is not relieved with rest or lying down.  His pain does radiate up his right upper back.  He denies any paresthesias, numbness or tingling.  Negative for previous back pain or hip pain    Review of Systems   Constitutional: Negative for chills and fever.   Musculoskeletal: Positive for back pain, falls, joint pain and myalgias. Negative for neck pain.   Neurological: Negative for tingling, sensory change and weakness.       PMH: History reviewed. No pertinent past medical history.  ALLERGIES: No Known Allergies  SURGHX: History reviewed. No pertinent surgical history.  SOCHX:   Social History     Socioeconomic History   • Marital status: Single     Spouse name: Not on file   • Number of children: Not on file   • Years of education: Not on file   • Highest education level: Not on file   Occupational History   • Not on file   Tobacco Use   • Smoking status: Former Smoker   •  "Smokeless tobacco: Never Used   • Tobacco comment: vape   Vaping Use   • Vaping Use: Some days   • Substances: Nicotine   Substance and Sexual Activity   • Alcohol use: Yes     Comment: Soc   • Drug use: Not Currently     Types: Marijuana   • Sexual activity: Not on file   Other Topics Concern   • Not on file   Social History Narrative   • Not on file     Social Determinants of Health     Financial Resource Strain:    • Difficulty of Paying Living Expenses: Not on file   Food Insecurity:    • Worried About Running Out of Food in the Last Year: Not on file   • Ran Out of Food in the Last Year: Not on file   Transportation Needs:    • Lack of Transportation (Medical): Not on file   • Lack of Transportation (Non-Medical): Not on file   Physical Activity:    • Days of Exercise per Week: Not on file   • Minutes of Exercise per Session: Not on file   Stress:    • Feeling of Stress : Not on file   Social Connections:    • Frequency of Communication with Friends and Family: Not on file   • Frequency of Social Gatherings with Friends and Family: Not on file   • Attends Caoda[employer] Services: Not on file   • Active Member of Clubs or Organizations: Not on file   • Attends Club or Organization Meetings: Not on file   • Marital Status: Not on file   Intimate Partner Violence:    • Fear of Current or Ex-Partner: Not on file   • Emotionally Abused: Not on file   • Physically Abused: Not on file   • Sexually Abused: Not on file   Housing Stability:    • Unable to Pay for Housing in the Last Year: Not on file   • Number of Places Lived in the Last Year: Not on file   • Unstable Housing in the Last Year: Not on file     FH: History reviewed. No pertinent family history.      Objective:   /62 (BP Location: Left arm, Patient Position: Sitting, BP Cuff Size: Adult)   Pulse 80   Temp 36.4 °C (97.6 °F) (Temporal)   Resp 18   Ht 1.727 m (5' 8\")   Wt 68 kg (150 lb)   SpO2 96%   BMI 22.81 kg/m²     Physical Exam  Vitals and " nursing note reviewed.   Constitutional:       General: He is not in acute distress.     Appearance: Normal appearance. He is not ill-appearing.   HENT:      Head: Normocephalic and atraumatic.      Right Ear: External ear normal.      Left Ear: External ear normal.      Nose: No congestion or rhinorrhea.      Mouth/Throat:      Mouth: Mucous membranes are moist.   Eyes:      Extraocular Movements: Extraocular movements intact.      Pupils: Pupils are equal, round, and reactive to light.   Cardiovascular:      Rate and Rhythm: Normal rate and regular rhythm.      Pulses: Normal pulses.      Heart sounds: Normal heart sounds.   Pulmonary:      Effort: Pulmonary effort is normal.      Breath sounds: Normal breath sounds.   Abdominal:      General: Abdomen is flat. Bowel sounds are normal.      Palpations: Abdomen is soft.      Tenderness: There is no abdominal tenderness. There is no right CVA tenderness or left CVA tenderness.   Musculoskeletal:      Cervical back: Normal range of motion and neck supple.      Lumbar back: Tenderness present. No swelling, edema, deformity, signs of trauma, lacerations, spasms or bony tenderness. Decreased range of motion. Positive right straight leg raise test. Negative left straight leg raise test. No scoliosis.        Back:       Right hip: Tenderness and bony tenderness present. No deformity, lacerations or crepitus. Normal range of motion. Normal strength.      Comments: Bruising present to right lateral hip.   Skin:     General: Skin is warm and dry.      Capillary Refill: Capillary refill takes less than 2 seconds.   Neurological:      General: No focal deficit present.      Mental Status: He is alert and oriented to person, place, and time. Mental status is at baseline.   Psychiatric:         Mood and Affect: Mood normal.         Behavior: Behavior normal.         Thought Content: Thought content normal.         Judgment: Judgment normal.         Assessment/Plan:   Assessment     1. Strain of lumbar paraspinal muscle, initial encounter  cyclobenzaprine (FLEXERIL) 5 mg tablet    ibuprofen (MOTRIN) 800 MG Tab   2. Acute right hip pain  cyclobenzaprine (FLEXERIL) 5 mg tablet    ibuprofen (MOTRIN) 800 MG Tab   3. Fall due to slipping on ice or snow, initial encounter     Patient encouraged to use rest, ice, heat, ibuprofen and Tylenol as needed for relief of pain.  Flexeril sent to pharmacy for relief of spasms.  He was educated of sedative side effects.  Patient to follow-up for worsening or persistent symptoms.  X-rays not indicated today has I do believe that his symptoms are related to muscle strain following fall on ice.     AVS handout given and reviewed with patient. Pt educated on red flags and when to seek treatment back in ER or UC.